# Patient Record
Sex: MALE
[De-identification: names, ages, dates, MRNs, and addresses within clinical notes are randomized per-mention and may not be internally consistent; named-entity substitution may affect disease eponyms.]

---

## 2020-02-03 ENCOUNTER — HOSPITAL ENCOUNTER (OUTPATIENT)
Dept: HOSPITAL 5 - CT | Age: 85
Discharge: HOME | End: 2020-02-03
Attending: UROLOGY
Payer: MEDICARE

## 2020-02-03 DIAGNOSIS — K57.30: ICD-10-CM

## 2020-02-03 DIAGNOSIS — N28.1: Primary | ICD-10-CM

## 2020-02-03 DIAGNOSIS — C64.2: ICD-10-CM

## 2020-02-03 DIAGNOSIS — K40.90: ICD-10-CM

## 2020-02-03 PROCEDURE — 74176 CT ABD & PELVIS W/O CONTRAST: CPT

## 2020-02-03 NOTE — CAT SCAN REPORT
CT ABDOMEN AND PELVIS WITHOUT CONTRAST



INDICATION: C64.2 RENAL CANCER.



COMPARISON: No relevant prior imaging study available.



TECHNIQUE: Axial, coronal and sagittal CT imaging of the abdomen and pelvis was performed  without co
ntrast.  Lack of intravenous contrast limits evaluation of the vascular and solid organs.  All CT sca
ns at this location are performed using CT dose reduction for ALARA by means of automated exposure co
ntrol.

 

FINDINGS: 

LOWER CHEST: The heart is normal in size without a significant pericardial effusion. Pacing leads are
 partially visualized and appear unremarkable. There is bibasilar atelectasis. No additional signific
ant abnormality.

LIVER: No significant abnormality.

BILIARY: No significant abnormality.

PANCREAS: No significant abnormality.

SPLEEN: No significant abnormality.

ADRENALS: No significant abnormality.

KIDNEYS AND URETERS: Multiple right renal cysts appear simple by noncontrast imaging and measure up t
o 12.6 x 10.9 cm along the lower pole. No additional significant abnormality. The left kidney is surg
ically absent. No mass is seen along the left renal bed.



GI TRACT: No significant abnormality of the stomach or small bowel. There is sigmoid diverticulosis w
ithout evidence of diverticulitis. No additional significant colonic abnormality. Unremarkable append
ix. 

PERITONEUM: No free fluid. No free air. No fluid collection.

LYMPH NODES: No significant adenopathy.

VASCULATURE: The aorta is normal in caliber with mild to moderate generalized atherosclerosis. 



URINARY BLADDER: No significant abnormality.

REPRODUCTIVE ORGANS: No significant abnormality.



ADDITIONAL FINDINGS: There is a moderate right inguinal hernia containing fat without associated infl
ammation.



SKELETAL SYSTEM: No acute abnormality. There is osteopenia/osteoporosis with degenerative changes thr
oughout the spine and pelvis and surgical changes along the lumbar spine.



IMPRESSION: 

1. No acute abnormality of the abdomen or pelvis.

2. No evidence of recurrent/metastatic disease.

3. Additional findings as above.



Signer Name: Aaron Colon MD 

Signed: 2/3/2020 12:09 PM

 Workstation Name: [id number]

## 2021-04-12 ENCOUNTER — HOSPITAL ENCOUNTER (OUTPATIENT)
Dept: HOSPITAL 5 - OR | Age: 86
Discharge: HOME | End: 2021-04-12
Attending: UROLOGY
Payer: MEDICARE

## 2021-04-12 VITALS — SYSTOLIC BLOOD PRESSURE: 144 MMHG | DIASTOLIC BLOOD PRESSURE: 100 MMHG

## 2021-04-12 DIAGNOSIS — Z85.89: ICD-10-CM

## 2021-04-12 DIAGNOSIS — Z88.2: ICD-10-CM

## 2021-04-12 DIAGNOSIS — Z98.890: ICD-10-CM

## 2021-04-12 DIAGNOSIS — I25.10: ICD-10-CM

## 2021-04-12 DIAGNOSIS — Z98.42: ICD-10-CM

## 2021-04-12 DIAGNOSIS — Z88.0: ICD-10-CM

## 2021-04-12 DIAGNOSIS — Z87.891: ICD-10-CM

## 2021-04-12 DIAGNOSIS — R31.0: Primary | ICD-10-CM

## 2021-04-12 DIAGNOSIS — C67.4: ICD-10-CM

## 2021-04-12 DIAGNOSIS — Z90.5: ICD-10-CM

## 2021-04-12 DIAGNOSIS — C67.2: ICD-10-CM

## 2021-04-12 DIAGNOSIS — Z96.652: ICD-10-CM

## 2021-04-12 DIAGNOSIS — E66.9: ICD-10-CM

## 2021-04-12 DIAGNOSIS — I10: ICD-10-CM

## 2021-04-12 DIAGNOSIS — M19.90: ICD-10-CM

## 2021-04-12 DIAGNOSIS — G47.30: ICD-10-CM

## 2021-04-12 DIAGNOSIS — G43.909: ICD-10-CM

## 2021-04-12 DIAGNOSIS — Z98.41: ICD-10-CM

## 2021-04-12 DIAGNOSIS — Z88.8: ICD-10-CM

## 2021-04-12 PROCEDURE — U0003 INFECTIOUS AGENT DETECTION BY NUCLEIC ACID (DNA OR RNA); SEVERE ACUTE RESPIRATORY SYNDROME CORONAVIRUS 2 (SARS-COV-2) (CORONAVIRUS DISEASE [COVID-19]), AMPLIFIED PROBE TECHNIQUE, MAKING USE OF HIGH THROUGHPUT TECHNOLOGIES AS DESCRIBED BY CMS-2020-01-R: HCPCS

## 2021-04-12 PROCEDURE — 74018 RADEX ABDOMEN 1 VIEW: CPT

## 2021-04-12 PROCEDURE — 88307 TISSUE EXAM BY PATHOLOGIST: CPT

## 2021-04-12 PROCEDURE — A4217 STERILE WATER/SALINE, 500 ML: HCPCS

## 2021-04-12 PROCEDURE — 52234 CYSTOSCOPY AND TREATMENT: CPT

## 2021-04-12 PROCEDURE — 88112 CYTOPATH CELL ENHANCE TECH: CPT

## 2021-04-12 NOTE — XRAY REPORT
INTRAOPERATIVE FLUOROSCOPY: ABDOMEN



INDICATION:

HEMATURIA.



TECHNIQUE:

Intraoperative spot images were obtained during the procedure.



FINDINGS:

Only a  image was acquired. Further imaging and retrograde urography was not performed due to th
e patient's bladder cancer. Please see the procedure report for further details.



Fluoroscopy Time: 1 second. Fluoroscopy Images: 1.



Signer Name: Aaron Colon MD 

Signed: 4/12/2021 3:53 PM

Workstation Name: [id number]

## 2021-04-12 NOTE — DISCHARGE SUMMARY
Short Stay Discharge Plan


Activity: other (no straining )


Weight Bearing Status: Full Weight Bearing


Diet: low cholesterol


Special Instructions: other (teach [facility] care )


Durable Medical Equipment Needed Upon Discharge: other (andrade )


Follow up with: 


TERRIE SAINZ MD [Primary Care Provider] - 7 Days


SHANA WESTON MD [Staff Physician] - 7 Days

## 2021-04-12 NOTE — POST OPERATIVE NOTE
Date of procedure: 04/12/21


Pre-op diagnosis: bladder cancer 


Post-op diagnosis: same


Findings: 





bt


Procedure: 





cysto turbt


Anesthesia: GETA


Estimated blood loss: none


Pathology: list (bt urine)


Specimen disposition: to lab


Condition: stable


Disposition: PACU

## 2021-04-12 NOTE — ANESTHESIA CONSULTATION
Anesthesia Consult and Med Hx


Date of service: 04/12/21





- Airway


Anesthetic Teeth Evaluation: Good, Chipped (#9), Caps, Crowns


ROM Head & Neck: Adequate


Mental/Hyoid Distance: Adequate


Mallampati Class: Class III


Intubation Access Assessment: Possibly Difficult





- Pulmonary Exam


CTA: Yes





- Cardiac Exam


Cardiac Exam: RRR





- Pre-Operative Health Status


ASA Pre-Surgery Classification: ASA3


Proposed Anesthetic Plan: General





- Pulmonary


Hx Smoking: Yes (former smoker)


SOB: Yes (recent neg ST)


COPD: No


Home Oxygen Therapy: No


Hx Sleep Apnea: Yes (compliant with CPAP)





- Cardiovascular System


Hx Hypertension: Yes (took amlodipine this morning)


Hx Coronary Artery Disease: Yes (nonobstructive CAD most recent [facility])


Hx Heart Attack/AMI: No


Hx Percutaneous Transluminal Coronary Angioplasty (PTCA): No


Hx Cardia Arrhythmia: Yes (a-flutter, SSS)


Hx Pacemaker: Yes


Hx Internal Defibrillator: No





- Central Nervous System


CVA: No





- Gastrointestinal


Hx Gastroesophageal Reflux Disease: No





- Endocrine


Hx Liver Disease: No


Hx Insulin Dependent Diabetes: No


Hx Non-Insulin Dependent Diabetes: No


Hx Thyroid Disease: No





- Other Systems


Hx Obesity: Yes (BMI 39)





- Additional Comments


Anesthesia Medical History Comments: No hx anesthetic complications.

## 2021-04-12 NOTE — OPERATIVE REPORT
PREOPERATIVE DIAGNOSES:  Gross hematuria, papillary bladder tumor.



POSTOPERATIVE DIAGNOSES:  Gross hematuria, papillary bladder tumor.



PROCEDURES:  Cystoscopy, transurethral resection of bladder tumor.



SURGEON:  Dr. Patterson.



ANESTHESIA:  General.



FINDINGS:  This is a gentleman with a 2 cm bladder tumor left posterolateral

wall, now presents for treatment.



DESCRIPTION OF PROCEDURE:  The patient was brought to the operating room and

placed in the operating table.  Following induction of anesthesia, placed in

lithotomy position, prepped and draped in the usual sterile fashion.  Tumor was

well visualized.  This area was resected down to muscle.  The patient is 85, we

did not want to be heroic.  There was no visible disease after the resection. 

The patient tolerated the procedure well.  No perforation.  A 20-catheter was

placed.  He was brought to recovery in stable condition.  We tried to call the

wife, there was no answer.





DD: 04/12/2021 13:36

DT: 04/12/2021 16:56

JOB# [id number]  [id number]

HANDY/ANGIE

## 2021-09-30 LAB
ALBUMIN SERPL-MCNC: 4 G/DL (ref 3.9–5)
ALT SERPL-CCNC: 20 UNITS/L (ref 7–56)
BUN SERPL-MCNC: 18 MG/DL (ref 9–20)
BUN/CREAT SERPL: 15 %
CALCIUM SERPL-MCNC: 8.7 MG/DL (ref 8.4–10.2)
HCT VFR BLD CALC: 46.8 % (ref 35.5–45.6)
HEMOLYSIS INDEX: 6
HGB BLD-MCNC: 15.5 GM/DL (ref 11.8–15.2)
MCHC RBC AUTO-ENTMCNC: 33 % (ref 32–34)
MCV RBC AUTO: 97 FL (ref 84–94)
PLATELET # BLD: 169 K/MM3 (ref 140–440)
RBC # BLD AUTO: 4.81 M/MM3 (ref 3.65–5.03)

## 2021-09-30 NOTE — ANESTHESIA CONSULTATION
Anesthesia Consult and Med Hx


Date of service: 10/04/21





- Airway


Anesthetic Teeth Evaluation: Good


ROM Head & Neck: Adequate


Mental/Hyoid Distance: Adequate


Mallampati Class: Class III


Intubation Access Assessment: Possibly Difficult (previous LMA 5)





- Pulmonary Exam


CTA: Yes





- Cardiac Exam


Cardiac Exam: RRR





- Pre-Operative Health Status


ASA Pre-Surgery Classification: ASA3


Proposed Anesthetic Plan: General





- Pulmonary


Hx Smoking: Yes (casual smoker quit 50yrs ago)


Hx Respiratory Symptoms: No


Hx Sleep Apnea: Yes (+ CPAP)





- Cardiovascular System


Hx Hypertension: No (occasionally elevated but dx or rx)


Hx Coronary Artery Disease: Yes (non-obstructive)


Hx Heart Attack/AMI: No


Hx Percutaneous Transluminal Coronary Angioplasty (PTCA): No


Hx Cardia Arrhythmia: Yes (a-flutter s/p pacemaker; ASA only)


Hx Pacemaker: Yes


Hx Internal Defibrillator: No





- Central Nervous System


CVA: No





- Endocrine


Hx Renal Disease: No


Hx Liver Disease: No


Hx Insulin Dependent Diabetes: No


Hx Non-Insulin Dependent Diabetes: No


Hx Thyroid Disease: No





- Other Systems


Hx Cancer: Yes (hx bladder ca; remote hx renal ca s/p left nephrectomy)


Hx Obesity: Yes (BMI 39)





- Additional Comments


Anesthesia Medical History Comments: No hx anesthetic complications.

## 2021-10-04 ENCOUNTER — HOSPITAL ENCOUNTER (OUTPATIENT)
Dept: HOSPITAL 5 - OR | Age: 86
Discharge: HOME | End: 2021-10-04
Attending: UROLOGY
Payer: MEDICARE

## 2021-10-04 VITALS — DIASTOLIC BLOOD PRESSURE: 70 MMHG | SYSTOLIC BLOOD PRESSURE: 155 MMHG

## 2021-10-04 DIAGNOSIS — Z88.0: ICD-10-CM

## 2021-10-04 DIAGNOSIS — Z88.2: ICD-10-CM

## 2021-10-04 DIAGNOSIS — M19.90: ICD-10-CM

## 2021-10-04 DIAGNOSIS — G47.30: ICD-10-CM

## 2021-10-04 DIAGNOSIS — Z87.891: ICD-10-CM

## 2021-10-04 DIAGNOSIS — Z98.890: ICD-10-CM

## 2021-10-04 DIAGNOSIS — E66.9: ICD-10-CM

## 2021-10-04 DIAGNOSIS — Z79.899: ICD-10-CM

## 2021-10-04 DIAGNOSIS — Z79.82: ICD-10-CM

## 2021-10-04 DIAGNOSIS — C67.4: Primary | ICD-10-CM

## 2021-10-04 DIAGNOSIS — Z88.8: ICD-10-CM

## 2021-10-04 DIAGNOSIS — I25.10: ICD-10-CM

## 2021-10-04 DIAGNOSIS — N40.0: ICD-10-CM

## 2021-10-04 PROCEDURE — U0003 INFECTIOUS AGENT DETECTION BY NUCLEIC ACID (DNA OR RNA); SEVERE ACUTE RESPIRATORY SYNDROME CORONAVIRUS 2 (SARS-COV-2) (CORONAVIRUS DISEASE [COVID-19]), AMPLIFIED PROBE TECHNIQUE, MAKING USE OF HIGH THROUGHPUT TECHNOLOGIES AS DESCRIBED BY CMS-2020-01-R: HCPCS

## 2021-10-04 PROCEDURE — 85027 COMPLETE CBC AUTOMATED: CPT

## 2021-10-04 PROCEDURE — 80053 COMPREHEN METABOLIC PANEL: CPT

## 2021-10-04 PROCEDURE — 36415 COLL VENOUS BLD VENIPUNCTURE: CPT

## 2021-10-04 PROCEDURE — 74176 CT ABD & PELVIS W/O CONTRAST: CPT

## 2021-10-04 PROCEDURE — C1758 CATHETER, URETERAL: HCPCS

## 2021-10-04 PROCEDURE — 74420 UROGRAPHY RTRGR +-KUB: CPT

## 2021-10-04 PROCEDURE — 52234 CYSTOSCOPY AND TREATMENT: CPT

## 2021-10-04 PROCEDURE — A4217 STERILE WATER/SALINE, 500 ML: HCPCS

## 2021-10-04 PROCEDURE — 88305 TISSUE EXAM BY PATHOLOGIST: CPT

## 2021-10-04 NOTE — POST OPERATIVE NOTE
Pre-op diagnosis: bladder cancer 


Post-op diagnosis: same


Findings: 





small bt 


j hooking 


? mass R


Procedure: 





cysto r rpg rexcision bt 


Anesthesia: GETA


Surgeon: SHANA WESTON


Estimated blood loss: none


Pathology: list (bladder)


Specimen disposition: to lab


Condition: stable


Disposition: PACU

## 2021-10-04 NOTE — CAT SCAN REPORT
CT abdomen pelvis wo con



INDICATION:

S/P CYSTO(R)RPG,REEXCISION BTMASS R.



COMPARISON: 2/3/2020 CT abdomen pelvis



TECHNIQUE: Abdominal and pelvic CT exam performed. All CT scans at this location are performed using 
CT dose reduction for ALARA by means of automated exposure control.



FINDINGS:



CT ABDOMEN and PELVIS:

Lung Bases: No significant abnormality.

Liver: No significant abnormality.

Biliary: No significant abnormality.

Spleen: No significant abnormality.

Pancreas: No significant abnormality.

Adrenals: No significant abnormality.

Kidneys: Right renal cysts are unchanged including the large exophytic right lower pole renal cyst. T
he large right lower pole cyst results in leftward deviation of the cecum. Indeterminate right upper 
pole 2.5 cm lesion is not significantly changed from prior exam comment demonstrating Hounsfield unit
s approximately 25. A few nonobstructing right renal stones. No hydronephrosis. Prior left nephrectom
y.

Bladder: Bladder is decompressed via Chowdary catheter. Thus the bladder is not well evaluated. Lymphati
cs: No lymphadenopathy.

Vasculature: No significant abnormality. 

Bowel: Diverticulosis without colonic wall thickening or pericolonic stranding. Normal appendix.

Pelvis: No significant abnormality.

Osseous Structures: Unchanged osseous fusion of the posterior elements of the lumbar spine.

Additional Findings: Fat-containing right inguinal hernia without complicating features.



IMPRESSION:

1. The bladder is decompressed via Chowdary catheter not well evaluated on this examination.

2. Unchanged 2.5 cm indeterminate right upper pole renal lesion. This most likely represents a comple
x cyst given that is unchanged compared to 2/3/2020. However, CT urogram can be obtained to evaluate 
the bladder and this lesion if indicated.



Signer Name: Nikhil Bolden MD 

Signed: 10/4/2021 1:02 PM

Workstation Name: [id number]

## 2021-10-04 NOTE — FLUOROSCOPY REPORT
INTRAOPERATIVE FLUOROSCOPY



INDICATION / CLINICAL INFORMATION:

HX OF BLADDER CANCER. 



TECHNIQUE:

Intraoperative spot images were obtained during the procedure.



FINDINGS:

Intraoperative fluoroscopy images.

See operative/procedure note by performing physician for full details.



Fluoroscopy Time: 23 seconds. Fluoroscopy Images: 7.



Signer Name: Nikhil Bolden MD 

Signed: 10/4/2021 1:17 PM

Workstation Name: [id number]

## 2021-10-04 NOTE — OPERATIVE REPORT
DATE OF SURGERY: 10/04/2021



PREOPERATIVE DIAGNOSES:  History of bladder tumor, small recurrent papillary 

tumor towards the bladder neck, enlarged prostate with J hooking right side, 

previous nephrectomy on left.



POSTOPERATIVE DIAGNOSES:  History of bladder tumor, small recurrent papillary 

tumor towards the bladder neck, enlarged prostate with J hooking right side, 

previous nephrectomy on left with possible retroperitoneal mass.



PROCEDURES:  Cystoscopy, excision of lesion, fulguration, retrograde.



SURGEON:  Jaspal Patterson MD



ANESTHESIA:  General.



FINDINGS:  This is a gentleman who is just about 84, who has a small recurrent 

tumor.  No active bleeding.  Negative FISH test.  He now presents for treatment.

 We tried to call the wife multiple times beforehand, but her phone would not 

 and recognized the hospital number.  She wanted to be aware of 

everything.  He was brought into the operating room and underwent excision of 

the tumor.  On the retrograde, this clearly hooking.  There was significant 

trabeculation and I told her that prostate was quite enlarged, but his 

creatinine is 1.2.  They looked on the retrograde that the ureter was deviated 

medially over the spinous processes and will get a CT scan in recovery room.  

The patient tolerated the procedure well.  There was no significant hydro.  It 

did drain, but there was J hooking.  The patient was brought to recovery room 

with a 20-Tamazight catheter.  I met with the wife in person and he will be 

discharged with a catheter.







DD: 10/04/2021 12:04 PM

DT: 10/04/2021 12:40 PM

TID: [id number] RECEIPT: [id number]

KALLIE

## 2021-10-04 NOTE — DISCHARGE SUMMARY
Short Stay Discharge Plan


Activity: other (no straining )


Weight Bearing Status: Full Weight Bearing


Diet: low fat, low cholesterol, low salt


Special Instructions: other (teach [facility] care )


Follow up with: 


TERRIE SAINZ MD [Primary Care Provider] - 7 Days


SHANA WESTON MD [Staff Physician] - 3 Days